# Patient Record
Sex: FEMALE | Race: WHITE | NOT HISPANIC OR LATINO | Employment: OTHER | URBAN - METROPOLITAN AREA
[De-identification: names, ages, dates, MRNs, and addresses within clinical notes are randomized per-mention and may not be internally consistent; named-entity substitution may affect disease eponyms.]

---

## 2022-01-12 ENCOUNTER — OFFICE VISIT (OUTPATIENT)
Dept: CARDIOLOGY | Facility: CLINIC | Age: 72
End: 2022-01-12

## 2022-01-12 VITALS
SYSTOLIC BLOOD PRESSURE: 119 MMHG | HEART RATE: 65 BPM | DIASTOLIC BLOOD PRESSURE: 72 MMHG | BODY MASS INDEX: 29.16 KG/M2 | OXYGEN SATURATION: 97 % | WEIGHT: 175 LBS | HEIGHT: 65 IN

## 2022-01-12 DIAGNOSIS — R00.2 PALPITATIONS: ICD-10-CM

## 2022-01-12 DIAGNOSIS — I10 HYPERTENSION, UNSPECIFIED TYPE: ICD-10-CM

## 2022-01-12 PROCEDURE — 99203 OFFICE O/P NEW LOW 30 MIN: CPT | Performed by: INTERNAL MEDICINE

## 2022-01-12 PROCEDURE — 93010 ELECTROCARDIOGRAM REPORT: CPT | Performed by: INTERNAL MEDICINE

## 2022-01-12 RX ORDER — ZINC GLUCONATE 50 MG
50 TABLET ORAL DAILY
COMMUNITY

## 2022-01-12 RX ORDER — LISINOPRIL AND HYDROCHLOROTHIAZIDE 25; 20 MG/1; MG/1
TABLET ORAL
COMMUNITY
End: 2022-02-15 | Stop reason: SDUPTHER

## 2022-01-12 RX ORDER — LISINOPRIL AND HYDROCHLOROTHIAZIDE 25; 20 MG/1; MG/1
1 TABLET ORAL DAILY
COMMUNITY
Start: 2021-12-28

## 2022-01-12 RX ORDER — MOMETASONE FUROATE 1 MG/G
CREAM TOPICAL
Status: ON HOLD | COMMUNITY
End: 2022-03-21

## 2022-01-12 RX ORDER — ROSUVASTATIN CALCIUM 10 MG/1
10 TABLET, COATED ORAL DAILY
COMMUNITY
Start: 2021-12-28

## 2022-01-12 RX ORDER — MULTIVIT WITH MINERALS/LUTEIN
1000 TABLET ORAL DAILY
COMMUNITY

## 2022-01-12 RX ORDER — OMEPRAZOLE 20 MG/1
CAPSULE, DELAYED RELEASE ORAL
Status: ON HOLD | COMMUNITY
Start: 2019-01-29 | End: 2022-03-21

## 2022-01-12 NOTE — PROGRESS NOTES
HP      Name: Becky Carmona ADMIT: (Not on file)   : 1950  PCP: Medhat Gregory MD    MRN: [id number] LOS: 0 days   AGE/SEX: 71 y.o. female  ROOM: Room/bed info not found     Chief Complaint   Patient presents with   • Palpitations     New Pt Consult   • Hypertension     New Pt Consult       Subjective         History of present illness  Becky Carmona is a 71-year-old female patient who has had palpitations for many years, just today for evaluation.  Patient does not have a prior history of coronary artery disease or other cardiac problems.  Previously she has had evaluation for palpitations but no arrhythmias could be found.  Here lately however her symptoms are becoming more frequent occurring several times a month and lasting up to several hours.  When she has the palpitations she feels short of breath and also notices that her blood pressure drops.  Denies any chest pain no syncopal episodes no lower extremity edema.    No past medical history on file.  No past surgical history on file.  No family history on file.  Social History     Tobacco Use   • Smoking status: Never Smoker   • Smokeless tobacco: Not on file   Substance Use Topics   • Alcohol use: Not on file   • Drug use: Not on file     (Not in a hospital admission)    Allergies:  Ciprofloxacin and Nitrofurantoin macrocrystal    Review of systems    Constitutional: Negative.    Respiratory and cardiovascular: As detailed in HPI section.  Gastrointestinal: Negative for constipation, nausea and vomiting negative for abdominal distention, abdominal pain and diarrhea.   Genitourinary: Negative for difficulty urinating and flank pain.   Musculoskeletal: Negative for arthralgias, joint swelling and myalgias.   Skin: Negative for color change, rash and wound.   Neurological: Negative for dizziness, syncope, weakness and headaches.   Hematological: Negative for adenopathy.   Psychiatric/Behavioral: Negative for confusion.   All other  systems reviewed and are negative.    Physical Exam  VITALS REVIEWED    General:      well developed, in no acute distress.    Head:      normocephalic and atraumatic.    Eyes:      PERRL/EOM intact, conjunctiva and sclera clear with out nystagmus.    Neck:      no masses, thyromegaly,  trachea central with normal respiratory effort and PMI displaced laterally  Lungs:      Clear to auscultation bilaterally  Heart:       Regular rate and rhythm  Msk:      no deformity or scoliosis noted of thoracic or lumbar spine.    Pulses:      pulses normal in all 4 extremities.    Extremities:       No lower extremity edema  Neurologic:      no focal deficits.   alert oriented x3  Skin:      intact without lesions or rashes.    Psych:      alert and cooperative; normal mood and affect; normal attention span and concentration.      Result Review :                Pertinent cardiac workup    1. EKG 1/12/2022 sinus rhythm normal EKG.  2. Echocardiogram 1/12/2022 ejection fraction 60 to 65%.        Procedures        Assessment and Plan      Becky Carmona is a 71-year-old female patient who is here today for evaluation of palpitations which have been going on for several years, are becoming much more frequent occurring several times a month and lasting several hours each.  Her EKG today shows sinus rhythm but there is suspicion for atrial fibrillation based on her symptoms.  Her echo shows normal ejection fraction.  She does not have any symptoms suggestive of angina.  I would like to go ahead and order a 1 month monitor and see her in the clinic afterwards.  If arrhythmias are found then we will address them accordingly.  Patient is agreeable with the plan.    Diagnoses and all orders for this visit:    1. Hypertension, unspecified type    2. Palpitations  -     Mobile Cardiac Outpatient Telemetry; Future           No follow-ups on file.  Patient was given instructions and counseling regarding her condition or for health  maintenance advice. Please see specific information pulled into the AVS if appropriate.

## 2022-02-15 ENCOUNTER — TELEPHONE (OUTPATIENT)
Dept: CARDIOLOGY | Facility: CLINIC | Age: 72
End: 2022-02-15

## 2022-02-15 ENCOUNTER — PREP FOR SURGERY (OUTPATIENT)
Dept: OTHER | Facility: HOSPITAL | Age: 72
End: 2022-02-15

## 2022-02-15 ENCOUNTER — OFFICE VISIT (OUTPATIENT)
Dept: CARDIOLOGY | Facility: CLINIC | Age: 72
End: 2022-02-15

## 2022-02-15 VITALS
DIASTOLIC BLOOD PRESSURE: 76 MMHG | HEIGHT: 65 IN | WEIGHT: 180.25 LBS | OXYGEN SATURATION: 96 % | SYSTOLIC BLOOD PRESSURE: 120 MMHG | BODY MASS INDEX: 30.03 KG/M2 | HEART RATE: 174 BPM

## 2022-02-15 DIAGNOSIS — I48.0 PAROXYSMAL ATRIAL FIBRILLATION: Primary | ICD-10-CM

## 2022-02-15 DIAGNOSIS — I10 PRIMARY HYPERTENSION: ICD-10-CM

## 2022-02-15 PROCEDURE — 93000 ELECTROCARDIOGRAM COMPLETE: CPT | Performed by: INTERNAL MEDICINE

## 2022-02-15 PROCEDURE — 99214 OFFICE O/P EST MOD 30 MIN: CPT | Performed by: INTERNAL MEDICINE

## 2022-02-15 RX ORDER — SODIUM CHLORIDE 0.9 % (FLUSH) 0.9 %
3 SYRINGE (ML) INJECTION EVERY 12 HOURS SCHEDULED
Status: CANCELLED | OUTPATIENT
Start: 2022-02-15

## 2022-02-15 RX ORDER — FLECAINIDE ACETATE 50 MG/1
100 TABLET ORAL 2 TIMES DAILY
Qty: 60 TABLET | Refills: 3 | Status: ON HOLD | OUTPATIENT
Start: 2022-02-15 | End: 2022-03-21

## 2022-02-15 RX ORDER — SODIUM CHLORIDE 0.9 % (FLUSH) 0.9 %
3-10 SYRINGE (ML) INJECTION AS NEEDED
Status: CANCELLED | OUTPATIENT
Start: 2022-02-15

## 2022-02-15 NOTE — PROGRESS NOTES
HP      Name: Becky Carmona ADMIT: (Not on file)   : 1950  PCP: Medhat Gregory MD    MRN: [id number] LOS: 0 days   AGE/SEX: 71 y.o. female  ROOM: Room/bed info not found     Chief Complaint   Patient presents with   • Hypertension     atrial fibrillation on holter       Subjective         History of present illness  Becky Carmona is a 71-year-old female patient who has palpitations for many years, I had seen her in 2021 for the same.  Previously no diagnosis was made.  On that day she was in sinus rhythm however a Holter monitor was placed and patient developed palpitations yesterday and we received an alert showing atrial fibrillation with ventricular rates in the 170s.  Patient was called and she was seen in the clinic today.  She still complains of palpitations but mainly shortness of breath and weakness.  Denies any chest pain no syncopal episodes.  She says that the symptoms have been going on for several years with same presentation.    Past Medical History:   Diagnosis Date   • Hypertension      Past Surgical History:   Procedure Laterality Date   • CHOLECYSTECTOMY       Family History   Problem Relation Age of Onset   • Heart disease Mother    • Hypertension Mother    • Hypertension Father    • Heart disease Sister    • Hypertension Sister      Social History     Tobacco Use   • Smoking status: Never Smoker   • Smokeless tobacco: Never Used   Vaping Use   • Vaping Use: Never used   Substance Use Topics   • Alcohol use: Not Currently   • Drug use: Never     (Not in a hospital admission)    Allergies:  Ciprofloxacin and Nitrofurantoin macrocrystal    Review of systems    Constitutional: Negative.    Respiratory and cardiovascular: As detailed in HPI section.  Gastrointestinal: Negative for constipation, nausea and vomiting negative for abdominal distention, abdominal pain and diarrhea.   Genitourinary: Negative for difficulty urinating and flank pain.   Musculoskeletal:  Negative for arthralgias, joint swelling and myalgias.   Skin: Negative for color change, rash and wound.   Neurological: Negative for dizziness, syncope, weakness and headaches.   Hematological: Negative for adenopathy.   Psychiatric/Behavioral: Negative for confusion.   All other systems reviewed and are negative.    Physical Exam  VITALS REVIEWED    General:      well developed, in no acute distress.    Head:      normocephalic and atraumatic.    Eyes:      PERRL/EOM intact, conjunctiva and sclera clear with out nystagmus.    Neck:      no masses, thyromegaly,  trachea central with normal respiratory effort and PMI displaced laterally  Lungs:      Clear to auscultation bilaterally  Heart:       Tachycardic, irregular rhythm  Msk:      no deformity or scoliosis noted of thoracic or lumbar spine.    Pulses:      pulses normal in all 4 extremities.    Extremities:       No lower extremity edema  Neurologic:      no focal deficits.   alert oriented x3  Skin:      intact without lesions or rashes.    Psych:      alert and cooperative; normal mood and affect; normal attention span and concentration.      Result Review :                Pertinent cardiac workup    1. Echocardiogram 1/12/2022 ejection fraction 60 to 65%, mild aortic insufficiency  2. EKG 1/12/2022 sinus rhythm  3. EKG 2/15/2022 atrial fibrillation ventricular rate of 173 bpm        ECG 12 Lead    Date/Time: 2/15/2022 1:25 PM  Performed by: Hermila Wasserman MD  Authorized by: Hermila Wasserman MD   Comparison: compared with previous ECG   Comparison to previous ECG: Previously sinus  Rhythm: atrial fibrillation  Rate: tachycardic  BPM: 173  Conduction: conduction normal  ST Segments: ST segments normal  QRS axis: normal  Other findings: non-specific ST-T wave changes                Assessment and Plan      Becky Carmona is a 71-year-old female patient who has history of palpitations for several years also fatigue and extreme shortness of breath  during the episodes.  Previously no diagnosis was made however a Holter monitor was placed and she was found to be in atrial fibrillation with rapid ventricular rates on 2/14/2022.  Today she is also in A. fib with a rate of 173 on EKG.  I had a long discussion with the patient about A. fib management.  Since her atrial fibrillation is paroxysmal and she is very symptomatic when it, all efforts should be made towards rhythm control strategy.  I offered antiarrhythmic therapy versus ablation patient is more interested in having an ablation done.  I will go ahead and start her on Eliquis 5 mg twice daily also start her on flecainide 100 twice daily in addition to metoprolol 25 twice a day with plans for A. fib ablation in March.  Hopefully the patient will convert with antiarrhythmic therapy in the meantime, otherwise she will need cardioversion soon.  Patient is agreeable with the outlined plan.    Diagnoses and all orders for this visit:    1. Paroxysmal atrial fibrillation (HCC) (Primary)  Overview:  Added automatically from request for surgery [id number]      2. Primary hypertension    Other orders  -     apixaban (ELIQUIS) 5 MG tablet tablet; Take 1 tablet by mouth 2 (Two) Times a Day for 30 days.  Dispense: 60 tablet; Refill: 3  -     metoprolol tartrate (LOPRESSOR) 25 MG tablet; Take 1 tablet by mouth 2 (Two) Times a Day for 30 days.  Dispense: 60 tablet; Refill: 11  -     flecainide (TAMBOCOR) 50 MG tablet; Take 2 tablets by mouth 2 (Two) Times a Day for 30 days.  Dispense: 60 tablet; Refill: 3           No follow-ups on file.  Patient was given instructions and counseling regarding her condition or for health maintenance advice. Please see specific information pulled into the AVS if appropriate.

## 2022-02-15 NOTE — TELEPHONE ENCOUNTER
Tonya from [facility] called and stated that pt had new onset afib  bpm @ 11:01 2/14/22. Please advise.

## 2022-03-18 ENCOUNTER — LAB (OUTPATIENT)
Dept: LAB | Facility: HOSPITAL | Age: 72
End: 2022-03-18

## 2022-03-18 DIAGNOSIS — I48.0 PAROXYSMAL ATRIAL FIBRILLATION: ICD-10-CM

## 2022-03-18 LAB
ALBUMIN SERPL-MCNC: 4.6 G/DL (ref 3.5–5.2)
ALBUMIN/GLOB SERPL: 1.9 G/DL
ALP SERPL-CCNC: 103 U/L (ref 39–117)
ALT SERPL W P-5'-P-CCNC: 10 U/L (ref 1–33)
ANION GAP SERPL CALCULATED.3IONS-SCNC: 8.3 MMOL/L (ref 5–15)
AST SERPL-CCNC: 15 U/L (ref 1–32)
B-HCG UR QL: NEGATIVE
BASOPHILS # BLD AUTO: 0.03 10*3/MM3 (ref 0–0.2)
BASOPHILS NFR BLD AUTO: 0.4 % (ref 0–1.5)
BILIRUB SERPL-MCNC: 0.4 MG/DL (ref 0–1.2)
BUN SERPL-MCNC: 29 MG/DL (ref 8–23)
BUN/CREAT SERPL: 25.4 (ref 7–25)
CALCIUM SPEC-SCNC: 9.7 MG/DL (ref 8.6–10.5)
CHLORIDE SERPL-SCNC: 102 MMOL/L (ref 98–107)
CO2 SERPL-SCNC: 28.7 MMOL/L (ref 22–29)
CREAT SERPL-MCNC: 1.14 MG/DL (ref 0.57–1)
DEPRECATED RDW RBC AUTO: 45.3 FL (ref 37–54)
EGFRCR SERPLBLD CKD-EPI 2021: 51.6 ML/MIN/1.73
EOSINOPHIL # BLD AUTO: 0.16 10*3/MM3 (ref 0–0.4)
EOSINOPHIL NFR BLD AUTO: 2.4 % (ref 0.3–6.2)
ERYTHROCYTE [DISTWIDTH] IN BLOOD BY AUTOMATED COUNT: 14.2 % (ref 12.3–15.4)
GLOBULIN UR ELPH-MCNC: 2.4 GM/DL
GLUCOSE SERPL-MCNC: 92 MG/DL (ref 65–99)
HCT VFR BLD AUTO: 41 % (ref 34–46.6)
HGB BLD-MCNC: 13.7 G/DL (ref 12–15.9)
IMM GRANULOCYTES # BLD AUTO: 0.02 10*3/MM3 (ref 0–0.05)
IMM GRANULOCYTES NFR BLD AUTO: 0.3 % (ref 0–0.5)
INR PPP: 0.99 (ref 0.93–1.1)
LYMPHOCYTES # BLD AUTO: 2.17 10*3/MM3 (ref 0.7–3.1)
LYMPHOCYTES NFR BLD AUTO: 32.4 % (ref 19.6–45.3)
MAGNESIUM SERPL-MCNC: 2 MG/DL (ref 1.6–2.4)
MCH RBC QN AUTO: 29.6 PG (ref 26.6–33)
MCHC RBC AUTO-ENTMCNC: 33.4 G/DL (ref 31.5–35.7)
MCV RBC AUTO: 88.6 FL (ref 79–97)
MONOCYTES # BLD AUTO: 0.69 10*3/MM3 (ref 0.1–0.9)
MONOCYTES NFR BLD AUTO: 10.3 % (ref 5–12)
NEUTROPHILS NFR BLD AUTO: 3.62 10*3/MM3 (ref 1.7–7)
NEUTROPHILS NFR BLD AUTO: 54.2 % (ref 42.7–76)
NRBC BLD AUTO-RTO: 0 /100 WBC (ref 0–0.2)
PLATELET # BLD AUTO: 193 10*3/MM3 (ref 140–450)
PMV BLD AUTO: 12.2 FL (ref 6–12)
POTASSIUM SERPL-SCNC: 4.2 MMOL/L (ref 3.5–5.2)
PROT SERPL-MCNC: 7 G/DL (ref 6–8.5)
PROTHROMBIN TIME: 11 SECONDS (ref 9.6–11.7)
RBC # BLD AUTO: 4.63 10*6/MM3 (ref 3.77–5.28)
SARS-COV-2 ORF1AB RESP QL NAA+PROBE: NOT DETECTED
SODIUM SERPL-SCNC: 139 MMOL/L (ref 136–145)
WBC NRBC COR # BLD: 6.69 10*3/MM3 (ref 3.4–10.8)

## 2022-03-18 PROCEDURE — 80053 COMPREHEN METABOLIC PANEL: CPT

## 2022-03-18 PROCEDURE — U0004 COV-19 TEST NON-CDC HGH THRU: HCPCS

## 2022-03-18 PROCEDURE — 83735 ASSAY OF MAGNESIUM: CPT

## 2022-03-18 PROCEDURE — 36415 COLL VENOUS BLD VENIPUNCTURE: CPT

## 2022-03-18 PROCEDURE — 85025 COMPLETE CBC W/AUTO DIFF WBC: CPT

## 2022-03-18 PROCEDURE — U0005 INFEC AGEN DETEC AMPLI PROBE: HCPCS

## 2022-03-18 PROCEDURE — 85610 PROTHROMBIN TIME: CPT

## 2022-03-18 PROCEDURE — 81025 URINE PREGNANCY TEST: CPT

## 2022-03-20 ENCOUNTER — ANESTHESIA EVENT (OUTPATIENT)
Dept: CARDIOLOGY | Facility: HOSPITAL | Age: 72
End: 2022-03-20

## 2022-03-21 ENCOUNTER — HOSPITAL ENCOUNTER (OUTPATIENT)
Facility: HOSPITAL | Age: 72
Discharge: HOME | End: 2022-03-21
Attending: INTERNAL MEDICINE | Admitting: INTERNAL MEDICINE

## 2022-03-21 ENCOUNTER — ANESTHESIA (OUTPATIENT)
Dept: CARDIOLOGY | Facility: HOSPITAL | Age: 72
End: 2022-03-21

## 2022-03-21 VITALS
HEART RATE: 52 BPM | TEMPERATURE: 96.8 F | OXYGEN SATURATION: 94 % | HEIGHT: 64 IN | RESPIRATION RATE: 8 BRPM | BODY MASS INDEX: 30.67 KG/M2 | WEIGHT: 179.68 LBS | DIASTOLIC BLOOD PRESSURE: 48 MMHG | SYSTOLIC BLOOD PRESSURE: 121 MMHG

## 2022-03-21 DIAGNOSIS — I48.0 PAROXYSMAL ATRIAL FIBRILLATION: ICD-10-CM

## 2022-03-21 LAB
ACT BLD: 255 SECONDS (ref 89–137)
ACT BLD: 303 SECONDS (ref 89–137)
ACT BLD: 321 SECONDS (ref 89–137)
ACT BLD: 327 SECONDS (ref 89–137)
ACT BLD: 327 SECONDS (ref 89–137)
ACT BLD: 345 SECONDS (ref 89–137)
ACT BLD: 350 SECONDS (ref 89–137)

## 2022-03-21 PROCEDURE — A9270 NON-COVERED ITEM OR SERVICE: HCPCS | Performed by: INTERNAL MEDICINE

## 2022-03-21 PROCEDURE — 93656 COMPRE EP EVAL ABLTJ ATR FIB: CPT | Performed by: INTERNAL MEDICINE

## 2022-03-21 PROCEDURE — 25010000002 PHENYLEPHRINE 10 MG/ML SOLUTION 5 ML VIAL

## 2022-03-21 PROCEDURE — 63710000001 ACETAMINOPHEN 325 MG TABLET: Performed by: INTERNAL MEDICINE

## 2022-03-21 PROCEDURE — 25010000002 HEPARIN (PORCINE) 25000-0.45 UT/250ML-% SOLUTION

## 2022-03-21 PROCEDURE — 25010000002 PROTAMINE SULFATE PER 10 MG

## 2022-03-21 PROCEDURE — C1769 GUIDE WIRE: HCPCS | Performed by: INTERNAL MEDICINE

## 2022-03-21 PROCEDURE — C1732 CATH, EP, DIAG/ABL, 3D/VECT: HCPCS | Performed by: INTERNAL MEDICINE

## 2022-03-21 PROCEDURE — C1894 INTRO/SHEATH, NON-LASER: HCPCS | Performed by: INTERNAL MEDICINE

## 2022-03-21 PROCEDURE — C1766 INTRO/SHEATH,STRBLE,NON-PEEL: HCPCS | Performed by: INTERNAL MEDICINE

## 2022-03-21 PROCEDURE — 25010000002 DEXAMETHASONE SODIUM PHOSPHATE 20 MG/5ML SOLUTION

## 2022-03-21 PROCEDURE — G0378 HOSPITAL OBSERVATION PER HR: HCPCS

## 2022-03-21 PROCEDURE — 0 IOPAMIDOL PER 1 ML: Performed by: INTERNAL MEDICINE

## 2022-03-21 PROCEDURE — S0260 H&P FOR SURGERY: HCPCS | Performed by: INTERNAL MEDICINE

## 2022-03-21 PROCEDURE — 25010000002 HEPARIN (PORCINE) PER 1000 UNITS

## 2022-03-21 PROCEDURE — 25010000002 FENTANYL CITRATE (PF) 100 MCG/2ML SOLUTION

## 2022-03-21 PROCEDURE — C1733 CATH, EP, OTHR THAN COOL-TIP: HCPCS | Performed by: INTERNAL MEDICINE

## 2022-03-21 PROCEDURE — C1759 CATH, INTRA ECHOCARDIOGRAPHY: HCPCS | Performed by: INTERNAL MEDICINE

## 2022-03-21 PROCEDURE — 85347 COAGULATION TIME ACTIVATED: CPT

## 2022-03-21 PROCEDURE — C1730 CATH, EP, 19 OR FEW ELECT: HCPCS | Performed by: INTERNAL MEDICINE

## 2022-03-21 PROCEDURE — 25010000002 ONDANSETRON PER 1 MG

## 2022-03-21 PROCEDURE — 25010000002 PROPOFOL 10 MG/ML EMULSION

## 2022-03-21 RX ORDER — NALOXONE HCL 0.4 MG/ML
0.4 VIAL (ML) INJECTION AS NEEDED
Status: DISCONTINUED | OUTPATIENT
Start: 2022-03-21 | End: 2022-03-21 | Stop reason: HOSPADM

## 2022-03-21 RX ORDER — HEPARIN SODIUM 10000 [USP'U]/100ML
INJECTION, SOLUTION INTRAVENOUS CONTINUOUS PRN
Status: DISCONTINUED | OUTPATIENT
Start: 2022-03-21 | End: 2022-03-21 | Stop reason: SURG

## 2022-03-21 RX ORDER — SODIUM CHLORIDE 9 MG/ML
INJECTION, SOLUTION INTRAVENOUS CONTINUOUS PRN
Status: DISCONTINUED | OUTPATIENT
Start: 2022-03-21 | End: 2022-03-21 | Stop reason: SURG

## 2022-03-21 RX ORDER — OMEPRAZOLE 20 MG/1
20 CAPSULE, DELAYED RELEASE ORAL DAILY
COMMUNITY
End: 2023-01-18 | Stop reason: ALTCHOICE

## 2022-03-21 RX ORDER — ONDANSETRON 2 MG/ML
4 INJECTION INTRAMUSCULAR; INTRAVENOUS ONCE AS NEEDED
Status: DISCONTINUED | OUTPATIENT
Start: 2022-03-21 | End: 2022-03-21 | Stop reason: HOSPADM

## 2022-03-21 RX ORDER — ROSUVASTATIN CALCIUM 10 MG/1
10 TABLET, COATED ORAL DAILY
Status: DISCONTINUED | OUTPATIENT
Start: 2022-03-22 | End: 2022-03-21 | Stop reason: HOSPADM

## 2022-03-21 RX ORDER — DIPHENHYDRAMINE HYDROCHLORIDE 50 MG/ML
12.5 INJECTION INTRAMUSCULAR; INTRAVENOUS
Status: DISCONTINUED | OUTPATIENT
Start: 2022-03-21 | End: 2022-03-21 | Stop reason: HOSPADM

## 2022-03-21 RX ORDER — DEXAMETHASONE SODIUM PHOSPHATE 4 MG/ML
INJECTION, SOLUTION INTRA-ARTICULAR; INTRALESIONAL; INTRAMUSCULAR; INTRAVENOUS; SOFT TISSUE AS NEEDED
Status: DISCONTINUED | OUTPATIENT
Start: 2022-03-21 | End: 2022-03-21 | Stop reason: SURG

## 2022-03-21 RX ORDER — OXYCODONE HYDROCHLORIDE 5 MG/1
5 TABLET ORAL ONCE AS NEEDED
Status: DISCONTINUED | OUTPATIENT
Start: 2022-03-21 | End: 2022-03-21 | Stop reason: HOSPADM

## 2022-03-21 RX ORDER — ACETAMINOPHEN 325 MG/1
650 TABLET ORAL EVERY 6 HOURS PRN
Status: DISCONTINUED | OUTPATIENT
Start: 2022-03-21 | End: 2022-03-21 | Stop reason: HOSPADM

## 2022-03-21 RX ORDER — ONDANSETRON 2 MG/ML
INJECTION INTRAMUSCULAR; INTRAVENOUS AS NEEDED
Status: DISCONTINUED | OUTPATIENT
Start: 2022-03-21 | End: 2022-03-21 | Stop reason: SURG

## 2022-03-21 RX ORDER — FENTANYL CITRATE 50 UG/ML
INJECTION, SOLUTION INTRAMUSCULAR; INTRAVENOUS AS NEEDED
Status: DISCONTINUED | OUTPATIENT
Start: 2022-03-21 | End: 2022-03-21 | Stop reason: SURG

## 2022-03-21 RX ORDER — SODIUM CHLORIDE 9 MG/ML
30 INJECTION, SOLUTION INTRAVENOUS CONTINUOUS
Status: DISCONTINUED | OUTPATIENT
Start: 2022-03-21 | End: 2022-03-21

## 2022-03-21 RX ORDER — PROPOFOL 10 MG/ML
VIAL (ML) INTRAVENOUS AS NEEDED
Status: DISCONTINUED | OUTPATIENT
Start: 2022-03-21 | End: 2022-03-21 | Stop reason: SURG

## 2022-03-21 RX ORDER — MEPERIDINE HYDROCHLORIDE 25 MG/ML
12.5 INJECTION INTRAMUSCULAR; INTRAVENOUS; SUBCUTANEOUS
Status: DISCONTINUED | OUTPATIENT
Start: 2022-03-21 | End: 2022-03-21 | Stop reason: HOSPADM

## 2022-03-21 RX ORDER — PROMETHAZINE HYDROCHLORIDE 25 MG/1
25 TABLET ORAL ONCE AS NEEDED
Status: DISCONTINUED | OUTPATIENT
Start: 2022-03-21 | End: 2022-03-21 | Stop reason: HOSPADM

## 2022-03-21 RX ORDER — ROCURONIUM BROMIDE 10 MG/ML
INJECTION, SOLUTION INTRAVENOUS AS NEEDED
Status: DISCONTINUED | OUTPATIENT
Start: 2022-03-21 | End: 2022-03-21 | Stop reason: SURG

## 2022-03-21 RX ORDER — OXYCODONE HYDROCHLORIDE 5 MG/1
10 TABLET ORAL EVERY 4 HOURS PRN
Status: DISCONTINUED | OUTPATIENT
Start: 2022-03-21 | End: 2022-03-21 | Stop reason: HOSPADM

## 2022-03-21 RX ORDER — FLECAINIDE ACETATE 100 MG/1
50 TABLET ORAL 2 TIMES DAILY
Start: 2022-03-21 | End: 2022-07-20

## 2022-03-21 RX ORDER — HYDROMORPHONE HCL 110MG/55ML
0.5 PATIENT CONTROLLED ANALGESIA SYRINGE INTRAVENOUS
Status: DISCONTINUED | OUTPATIENT
Start: 2022-03-21 | End: 2022-03-21 | Stop reason: HOSPADM

## 2022-03-21 RX ORDER — EPHEDRINE SULFATE 5 MG/ML
5 INJECTION INTRAVENOUS ONCE AS NEEDED
Status: DISCONTINUED | OUTPATIENT
Start: 2022-03-21 | End: 2022-03-21 | Stop reason: HOSPADM

## 2022-03-21 RX ORDER — MIDAZOLAM HYDROCHLORIDE 1 MG/ML
1 INJECTION INTRAMUSCULAR; INTRAVENOUS
Status: DISCONTINUED | OUTPATIENT
Start: 2022-03-21 | End: 2022-03-21 | Stop reason: HOSPADM

## 2022-03-21 RX ORDER — PANTOPRAZOLE SODIUM 40 MG/1
40 TABLET, DELAYED RELEASE ORAL EVERY MORNING
Status: DISCONTINUED | OUTPATIENT
Start: 2022-03-22 | End: 2022-03-21 | Stop reason: HOSPADM

## 2022-03-21 RX ORDER — IPRATROPIUM BROMIDE AND ALBUTEROL SULFATE 2.5; .5 MG/3ML; MG/3ML
3 SOLUTION RESPIRATORY (INHALATION) ONCE AS NEEDED
Status: DISCONTINUED | OUTPATIENT
Start: 2022-03-21 | End: 2022-03-21 | Stop reason: HOSPADM

## 2022-03-21 RX ORDER — GLYCOPYRROLATE 0.2 MG/ML
INJECTION INTRAMUSCULAR; INTRAVENOUS AS NEEDED
Status: DISCONTINUED | OUTPATIENT
Start: 2022-03-21 | End: 2022-03-21 | Stop reason: SURG

## 2022-03-21 RX ORDER — LABETALOL HYDROCHLORIDE 5 MG/ML
5 INJECTION, SOLUTION INTRAVENOUS
Status: DISCONTINUED | OUTPATIENT
Start: 2022-03-21 | End: 2022-03-21 | Stop reason: HOSPADM

## 2022-03-21 RX ORDER — FLECAINIDE ACETATE 50 MG/1
50 TABLET ORAL 2 TIMES DAILY
Status: DISCONTINUED | OUTPATIENT
Start: 2022-03-21 | End: 2022-03-21 | Stop reason: HOSPADM

## 2022-03-21 RX ORDER — SODIUM CHLORIDE, SODIUM LACTATE, POTASSIUM CHLORIDE, CALCIUM CHLORIDE 600; 310; 30; 20 MG/100ML; MG/100ML; MG/100ML; MG/100ML
100 INJECTION, SOLUTION INTRAVENOUS CONTINUOUS
Status: DISCONTINUED | OUTPATIENT
Start: 2022-03-21 | End: 2022-03-21

## 2022-03-21 RX ORDER — LIDOCAINE HYDROCHLORIDE 10 MG/ML
INJECTION, SOLUTION EPIDURAL; INFILTRATION; INTRACAUDAL; PERINEURAL AS NEEDED
Status: DISCONTINUED | OUTPATIENT
Start: 2022-03-21 | End: 2022-03-21 | Stop reason: SURG

## 2022-03-21 RX ORDER — EPHEDRINE SULFATE 5 MG/ML
INJECTION INTRAVENOUS AS NEEDED
Status: DISCONTINUED | OUTPATIENT
Start: 2022-03-21 | End: 2022-03-21 | Stop reason: SURG

## 2022-03-21 RX ORDER — MOMETASONE FUROATE 1 MG/G
1 CREAM TOPICAL DAILY PRN
COMMUNITY

## 2022-03-21 RX ORDER — PROTAMINE SULFATE 10 MG/ML
INJECTION, SOLUTION INTRAVENOUS AS NEEDED
Status: DISCONTINUED | OUTPATIENT
Start: 2022-03-21 | End: 2022-03-21 | Stop reason: SURG

## 2022-03-21 RX ORDER — HEPARIN SODIUM 1000 [USP'U]/ML
INJECTION, SOLUTION INTRAVENOUS; SUBCUTANEOUS AS NEEDED
Status: DISCONTINUED | OUTPATIENT
Start: 2022-03-21 | End: 2022-03-21 | Stop reason: SURG

## 2022-03-21 RX ORDER — FLECAINIDE ACETATE 100 MG/1
100 TABLET ORAL 2 TIMES DAILY
Status: ON HOLD | COMMUNITY
End: 2022-03-21 | Stop reason: SDUPTHER

## 2022-03-21 RX ORDER — ASCORBIC ACID 500 MG
1000 TABLET ORAL DAILY
Status: DISCONTINUED | OUTPATIENT
Start: 2022-03-21 | End: 2022-03-21 | Stop reason: HOSPADM

## 2022-03-21 RX ORDER — PHENYLEPHRINE HCL IN 0.9% NACL 1 MG/10 ML
SYRINGE (ML) INTRAVENOUS AS NEEDED
Status: DISCONTINUED | OUTPATIENT
Start: 2022-03-21 | End: 2022-03-21 | Stop reason: SURG

## 2022-03-21 RX ORDER — FENTANYL CITRATE 50 UG/ML
25 INJECTION, SOLUTION INTRAMUSCULAR; INTRAVENOUS
Status: DISCONTINUED | OUTPATIENT
Start: 2022-03-21 | End: 2022-03-21 | Stop reason: HOSPADM

## 2022-03-21 RX ADMIN — EPHEDRINE SULFATE 10 MG: 5 INJECTION INTRAVENOUS at 08:50

## 2022-03-21 RX ADMIN — EPHEDRINE SULFATE 10 MG: 5 INJECTION INTRAVENOUS at 08:27

## 2022-03-21 RX ADMIN — EPHEDRINE SULFATE 10 MG: 5 INJECTION INTRAVENOUS at 08:28

## 2022-03-21 RX ADMIN — HEPARIN SODIUM 2000 UNITS: 1000 INJECTION, SOLUTION INTRAVENOUS; SUBCUTANEOUS at 09:18

## 2022-03-21 RX ADMIN — HEPARIN SODIUM 18 UNITS/KG/HR: 10000 INJECTION, SOLUTION INTRAVENOUS at 08:46

## 2022-03-21 RX ADMIN — PHENYLEPHRINE HYDROCHLORIDE 0.2 MCG/KG/MIN: 10 INJECTION INTRAVENOUS at 09:11

## 2022-03-21 RX ADMIN — EPHEDRINE SULFATE 5 MG: 5 INJECTION INTRAVENOUS at 08:34

## 2022-03-21 RX ADMIN — HEPARIN SODIUM 9000 UNITS: 1000 INJECTION, SOLUTION INTRAVENOUS; SUBCUTANEOUS at 08:44

## 2022-03-21 RX ADMIN — LIDOCAINE HYDROCHLORIDE 50 MG: 10 INJECTION, SOLUTION EPIDURAL; INFILTRATION; INTRACAUDAL; PERINEURAL at 08:20

## 2022-03-21 RX ADMIN — Medication 50 MCG: at 09:08

## 2022-03-21 RX ADMIN — Medication 100 MCG: at 08:54

## 2022-03-21 RX ADMIN — DEXAMETHASONE SODIUM PHOSPHATE 4 MG: 4 INJECTION, SOLUTION INTRAMUSCULAR; INTRAVENOUS at 09:18

## 2022-03-21 RX ADMIN — SODIUM CHLORIDE 30 ML/HR: 9 INJECTION, SOLUTION INTRAVENOUS at 06:50

## 2022-03-21 RX ADMIN — EPHEDRINE SULFATE 10 MG: 5 INJECTION INTRAVENOUS at 08:25

## 2022-03-21 RX ADMIN — EPHEDRINE SULFATE 10 MG: 5 INJECTION INTRAVENOUS at 09:07

## 2022-03-21 RX ADMIN — FENTANYL CITRATE 50 MCG: 50 INJECTION, SOLUTION INTRAMUSCULAR; INTRAVENOUS at 08:11

## 2022-03-21 RX ADMIN — FENTANYL CITRATE 25 MCG: 50 INJECTION, SOLUTION INTRAMUSCULAR; INTRAVENOUS at 09:39

## 2022-03-21 RX ADMIN — SUGAMMADEX 200 MG: 100 INJECTION, SOLUTION INTRAVENOUS at 11:11

## 2022-03-21 RX ADMIN — ROCURONIUM BROMIDE 40 MG: 10 INJECTION INTRAVENOUS at 08:20

## 2022-03-21 RX ADMIN — EPHEDRINE SULFATE 10 MG: 5 INJECTION INTRAVENOUS at 08:46

## 2022-03-21 RX ADMIN — GLYCOPYRROLATE 0.2 MCG: 0.2 INJECTION INTRAMUSCULAR; INTRAVENOUS at 08:54

## 2022-03-21 RX ADMIN — Medication 50 MCG: at 09:05

## 2022-03-21 RX ADMIN — ONDANSETRON 4 MG: 2 INJECTION INTRAMUSCULAR; INTRAVENOUS at 11:04

## 2022-03-21 RX ADMIN — PROPOFOL 150 MG: 10 INJECTION, EMULSION INTRAVENOUS at 08:20

## 2022-03-21 RX ADMIN — ACETAMINOPHEN 650 MG: 325 TABLET ORAL at 13:04

## 2022-03-21 RX ADMIN — HEPARIN SODIUM 2000 UNITS: 1000 INJECTION, SOLUTION INTRAVENOUS; SUBCUTANEOUS at 09:58

## 2022-03-21 RX ADMIN — HEPARIN SODIUM 4000 UNITS: 1000 INJECTION, SOLUTION INTRAVENOUS; SUBCUTANEOUS at 09:04

## 2022-03-21 RX ADMIN — PROPOFOL 20 MG: 10 INJECTION, EMULSION INTRAVENOUS at 08:58

## 2022-03-21 RX ADMIN — ROCURONIUM BROMIDE 10 MG: 10 INJECTION INTRAVENOUS at 08:58

## 2022-03-21 RX ADMIN — SODIUM CHLORIDE: 0.9 INJECTION, SOLUTION INTRAVENOUS at 08:06

## 2022-03-21 RX ADMIN — PROTAMINE SULFATE 50 MG: 10 INJECTION, SOLUTION INTRAVENOUS at 11:02

## 2022-03-21 RX ADMIN — HEPARIN SODIUM 1000 UNITS: 1000 INJECTION, SOLUTION INTRAVENOUS; SUBCUTANEOUS at 09:37

## 2022-03-21 NOTE — ANESTHESIA POSTPROCEDURE EVALUATION
Patient: Becky Carmona    Procedure Summary     Date: 03/21/22 Room / Location: [facility] /  [facility]    Anesthesia Start: 0806 Anesthesia Stop: 1124    Procedure: Ablation atrial fibrillation Ut[place] and Cryo aware (N/A ) Diagnosis:       Paroxysmal atrial fibrillation (HCC)      (aFib)    Providers: Hermila Wasserman MD Provider: Pedro Scott MD    Anesthesia Type: general ASA Status: 3          Anesthesia Type: general    Vitals  Vitals Value Taken Time   /54 03/21/22 1209   Temp 96.8 °F (36 °C) 03/21/22 1209   Pulse 53 03/21/22 1209   Resp 8 03/21/22 1209   SpO2 95 % 03/21/22 1209           Post Anesthesia Care and Evaluation    Patient location during evaluation: PACU  Patient participation: complete - patient participated  Level of consciousness: awake  Pain scale: See nurse's notes for pain score.  Pain management: adequate  Airway patency: patent  Anesthetic complications: No anesthetic complications  PONV Status: none  Cardiovascular status: acceptable  Respiratory status: acceptable  Hydration status: acceptable    Comments: Patient seen and examined postoperatively; vital signs stable; SpO2 greater than or equal to 90%; cardiopulmonary status stable; nausea/vomiting adequately controlled; pain adequately controlled; no apparent anesthesia complications; patient discharged from anesthesia care when discharge criteria were met

## 2022-03-21 NOTE — DISCHARGE SUMMARY
[facility]    Date of Admission: 3/21/2022    Date of Discharge:  3/21/2022    Length of stay:  LOS: 0 days     Admission Diagnosis: Recurrent paroxysmal atrial fibrillation, symptomatic with uncontrolled ventricular response    Discharge Diagnosis: Same, status post A. fib ablation    Presenting Problem/History of Present Illness  Active Hospital Problems    Diagnosis  POA   • **Paroxysmal atrial fibrillation (HCC) [I48.0]  Unknown     Priority: High      Resolved Hospital Problems   No resolved problems to display.          Hospital Course  Becky Carmona is a 71 y.o. female presented for elective A. fib ablation for the above condition.  The procedure was performed on 3/21/2022, no procedural complications.  The patient was monitored for several hours post procedure, she remained in sinus rhythm.  Groin exam revealed no bleeding or hematoma and patient was discharged home in a stable condition with the following medication changes.  Decrease flecainide from 100 to 50 twice daily, decrease metoprolol from 25 to 12.5 twice daily due to bradycardia.  The discharge process took about 35 minutes during which we discussed about long-term A. fib management, medication changes, anticoagulation.  The patient voiced understanding and all her  questions were answered to her  satisfaction.    Past Medical History:   Diagnosis Date   • A-fib (HCC)    • High triglycerides    • HLD (hyperlipidemia)    • Hypertension      Past Surgical History:   Procedure Laterality Date   • CHOLECYSTECTOMY     • UTERINE FIBROID SURGERY  2020     Social History     Socioeconomic History   • Marital status:    Tobacco Use   • Smoking status: Never Smoker   • Smokeless tobacco: Never Used   Vaping Use   • Vaping Use: Never used   Substance and Sexual Activity   • Alcohol use: Not Currently   • Drug use: Never   • Sexual activity: Defer       Procedures Performed: A. fib ablation with pulmonary vein isolation using cryotherapy and  "radiofrequency ablation       Pertinent Test Results:     Lab Results (last 72 hours)     ** No results found for the last 72 hours. **          Condition on Discharge: Stable    Vital Signs  /56   Pulse (!) 49   Temp 96.8 °F (36 °C) (Temporal)   Resp 8   Ht 162.6 cm (64\")   Wt 81.5 kg (179 lb 10.8 oz)   SpO2 99%   BMI 30.84 kg/m²     Physical Exam    Physical Exam  VITALS REVIEWED    General:      well developed, in no acute distress.    Head:      normocephalic and atraumatic.    Eyes:      PERRL/EOM intact, conjunctiva and sclera clear with out nystagmus.    Neck:      no masses, thyromegaly,  trachea central with normal respiratory effort and PMI displaced laterally  Lungs:      Clear  Heart:       Regular rate and rhythm  Msk:      no deformity or scoliosis noted of thoracic or lumbar spine.    Pulses:      pulses normal in all 4 extremities.  No bleeding or hematoma at vascular access site  Extremities:       No lower extremity edema  Neurologic:      no focal deficits.   alert oriented x3  Skin:      intact without lesions or rashes.    Psych:      alert and cooperative; normal mood and affect; normal attention span and concentration.      Discharge Disposition  Home or Self Care    Discharge Medications     Discharge Medications      Changes to Medications      Instructions Start Date   flecainide 100 MG tablet  Commonly known as: TAMBOCOR  What changed: how much to take   50 mg, Oral, 2 Times Daily      metoprolol tartrate 25 MG tablet  Commonly known as: LOPRESSOR  What changed: how much to take   12.5 mg, Oral, 2 Times Daily         Continue These Medications      Instructions Start Date   apixaban 5 MG tablet tablet  Commonly known as: ELIQUIS   5 mg, Oral, 2 Times Daily      ascorbic acid 1000 MG tablet  Commonly known as: VITAMIN C   1,000 mg, Oral, Daily      lisinopril-hydrochlorothiazide 20-25 MG per tablet  Commonly known as: PRINZIDE,ZESTORETIC   1 tablet, Oral, 2 Times Daily    "   mometasone 0.1 % cream  Commonly known as: ELOCON   1 application, Topical, Daily PRN, Bilateral ears      omeprazole 20 MG capsule  Commonly known as: priLOSEC   20 mg, Oral, Daily      rosuvastatin 10 MG tablet  Commonly known as: CRESTOR   10 mg, Oral, Daily      Vitamin D-3 125 MCG (5000 UT) tablet   1 tablet, Oral, Daily      Zinc 50 MG tablet   50 mg, Oral, Daily             Discharge Diet: Cardiac    Activity at Discharge: No heavy lifting for 3 to 5 days    Follow-up Appointments  No future appointments.    Risk for Readmission (LACE) Score: 1 (3/21/2022  1:14 PM)      Hermila Wasserman MD  03/21/22  15:07 EDT

## 2022-03-21 NOTE — H&P
History of present illness  Becky Carmona is a 71-year-old female patient who has palpitations for many years, I had seen her in January 2021 for the same.  Previously no diagnosis was made.  On that day she was in sinus rhythm however a Holter monitor was placed and patient developed palpitations yesterday and we received an alert showing atrial fibrillation with ventricular rates in the 170s.  Patient was called and she was seen in the clinic today.  She still complains of palpitations but mainly shortness of breath and weakness.  Denies any chest pain no syncopal episodes.  She says that the symptoms have been going on for several years with same presentation.          Past Medical History:   Diagnosis Date   • Hypertension              Past Surgical History:   Procedure Laterality Date   • CHOLECYSTECTOMY                Family History   Problem Relation Age of Onset   • Heart disease Mother     • Hypertension Mother     • Hypertension Father     • Heart disease Sister     • Hypertension Sister        Social History           Tobacco Use   • Smoking status: Never Smoker   • Smokeless tobacco: Never Used   Vaping Use   • Vaping Use: Never used   Substance Use Topics   • Alcohol use: Not Currently   • Drug use: Never      (Not in a hospital admission)     Allergies:  Ciprofloxacin and Nitrofurantoin macrocrystal     Review of systems     Constitutional: Negative.    Respiratory and cardiovascular: As detailed in HPI section.  Gastrointestinal: Negative for constipation, nausea and vomiting negative for abdominal distention, abdominal pain and diarrhea.   Genitourinary: Negative for difficulty urinating and flank pain.   Musculoskeletal: Negative for arthralgias, joint swelling and myalgias.   Skin: Negative for color change, rash and wound.   Neurological: Negative for dizziness, syncope, weakness and headaches.   Hematological: Negative for adenopathy.   Psychiatric/Behavioral: Negative for confusion.   All  other systems reviewed and are negative.     Physical Exam  VITALS REVIEWED     General:      well developed, in no acute distress.    Head:      normocephalic and atraumatic.    Eyes:      PERRL/EOM intact, conjunctiva and sclera clear with out nystagmus.    Neck:      no masses, thyromegaly,  trachea central with normal respiratory effort and PMI displaced laterally  Lungs:      Clear to auscultation bilaterally  Heart:       Tachycardic, irregular rhythm  Msk:      no deformity or scoliosis noted of thoracic or lumbar spine.    Pulses:      pulses normal in all 4 extremities.    Extremities:       No lower extremity edema  Neurologic:      no focal deficits.   alert oriented x3  Skin:      intact without lesions or rashes.    Psych:      alert and cooperative; normal mood and affect; normal attention span and concentration.             Result Review    :                     Pertinent cardiac workup     1. Echocardiogram 1/12/2022 ejection fraction 60 to 65%, mild aortic insufficiency  2. EKG 1/12/2022 sinus rhythm  3. EKG 2/15/2022 atrial fibrillation ventricular rate of 173 bpm           ECG 12 Lead     Date/Time: 2/15/2022 1:25 PM  Performed by: Hermila Wasserman MD  Authorized by: Hermila Wasserman MD   Comparison: compared with previous ECG   Comparison to previous ECG: Previously sinus  Rhythm: atrial fibrillation  Rate: tachycardic  BPM: 173  Conduction: conduction normal  ST Segments: ST segments normal  QRS axis: normal  Other findings: non-specific ST-T wave changes                     Assessment      Assessment and Plan       Becky Carmona is a 71-year-old female patient who has history of palpitations for several years also fatigue and extreme shortness of breath during the episodes.  Previously no diagnosis was made however a Holter monitor was placed and she was found to be in atrial fibrillation with rapid ventricular rates on 2/14/2022.  Today she is also in A. fib with a rate of 173 on EKG.   I had a long discussion with the patient about A. fib management.  Since her atrial fibrillation is paroxysmal and she is very symptomatic when it, all efforts should be made towards rhythm control strategy.  I offered antiarrhythmic therapy versus ablation patient is more interested in having an ablation done.  I will go ahead and start her on Eliquis 5 mg twice daily also start her on flecainide 100 twice daily in addition to metoprolol 25 twice a day with plans for A. fib ablation in March.  Hopefully the patient will convert with antiarrhythmic therapy in the meantime, otherwise she will need cardioversion soon.  Patient is agreeable with the outlined plan.

## 2022-03-21 NOTE — DISCHARGE INSTRUCTIONS
Post Ablation Instructions    Drink plenty of fluids for the next 24 hours.  This helps to eliminate the dye used in your procedure through urination.  You may resume a normal diet; however, try to avoid foods that would cause gas or constipation.    Sedative medication given to you during your catheterization may decrease your judgement and reaction time for up to 24-48 hours.  Therefore:  DO NOT drive or operate hazardous machinery (48 hours)  DO NOT consume alcoholic beverages  DO NOT make any important/legal decisions  Have someone stay with you for at least 24 hours    To allow proper healing and prevent bleeding, the following activities are to be strictly avoided for the next 24-48 hours:  Excessive bending at wound site  Straining (anything that would tense up muscles around the affected puncture site)  Lifting objects greater than 5 pounds, pushing, or pulling for 5 days  For Groin Cases:  Refrain from sexual activity  Refrain from running or vigorous walking  No prolonged sitting or standing  Limit stair climbing as much as possible    Keep the puncture site clean and dry.  You may remove the dressing tomorrow and replace it with a band-aid for at least one additional day.  Gently clean the site with mild soap and water.  No scrubbing/rubbing and lightly pat the area dry.  Showers are acceptable; however, avoid submerging in water (tub baths, hot tubs, swimming pools, dishwater, etc…) for at least one week.  The site should be completely healed before resuming these activities to reduce the risk of infection.  Check the site often.  Watch for signs and symptoms of infection and notify your physician if any of the following occur:  Bleeding or an increase in swelling at the puncture site  Fever  Increased soreness around puncture site  Foul odor or significant drainage from the puncture site  Swelling, redness, or warmth at the puncture site    **A bruise or small “pea sized” lump under the skin at the  puncture site is not unusual.  This should disappear within 3-4 weeks.**  CONTACT YOUR PHYSICIAN OR CALL 911 IF YOU EXPERIENCE ANY OF THE FOLLOWING:  Increased angina (chest pain) or frequent sensations of pressure, burning, pain, or other discomfort in the chest, arm, jaws, or stomach  Lightheadedness, dizziness, faint feeling, sweating, or difficulty breathing  Odd sensation changes like numbness, tingling, coldness, or pain in the arm or leg in which the catheter was inserted  Limb in which the catheter was inserted becomes pale/bluish in color    IMPORTANT:  Although this occurs very rarely, if you should develop bright red or excessive bleeding, feel a “pop” inside at the insertion site, or notice a sudden increase in swelling larger than a walnut, you should call 911.  Hold continuous firm pressure to the access site until emergency personnel arrive.  It is best if someone else can do this for you.

## 2022-03-21 NOTE — ANESTHESIA PREPROCEDURE EVALUATION
Anesthesia Evaluation     Patient summary reviewed and Nursing notes reviewed   NPO Solid Status: > 8 hours  NPO Liquid Status: > 8 hours           Airway   Mallampati: II  TM distance: >3 FB  Neck ROM: full  No difficulty expected  Dental - normal exam     Pulmonary    Cardiovascular     (+) hypertension, dysrhythmias Paroxysmal Atrial Fib, hyperlipidemia,       Neuro/Psych  GI/Hepatic/Renal/Endo      Musculoskeletal     Abdominal    Substance History      OB/GYN          Other                        Anesthesia Plan    ASA 3     general     intravenous induction     Anesthetic plan, all risks, benefits, and alternatives have been provided, discussed and informed consent has been obtained with: patient.    Plan discussed with CRNA and CAA.        CODE STATUS:

## 2022-03-21 NOTE — ANESTHESIA PROCEDURE NOTES
Airway  Urgency: elective    Date/Time: 3/21/2022 8:22 AM  Airway not difficult    General Information and Staff    Patient location during procedure: OR  Anesthesiologist: Pedro Scott MD  CRNA: Jenni Bello CAA    Indications and Patient Condition  Indications for airway management: airway protection    Preoxygenated: yes  Mask difficulty assessment: 1 - vent by mask    Final Airway Details  Final airway type: endotracheal airway      Successful airway: ETT  Cuffed: yes   Successful intubation technique: direct laryngoscopy  Facilitating devices/methods: intubating stylet  Endotracheal tube insertion site: oral  Blade: Trish  Blade size: 3  ETT size (mm): 7.0  Cormack-Lehane Classification: grade I - full view of glottis  Placement verified by: chest auscultation and capnometry   Measured from: lips  ETT/EBT  to lips (cm): 21  Number of attempts at approach: 1  Assessment: lips, teeth, and gum same as pre-op and atraumatic intubation

## 2022-03-23 ENCOUNTER — TELEPHONE (OUTPATIENT)
Dept: CARDIOLOGY | Facility: CLINIC | Age: 72
End: 2022-03-23

## 2022-04-27 ENCOUNTER — OFFICE VISIT (OUTPATIENT)
Dept: CARDIOLOGY | Facility: CLINIC | Age: 72
End: 2022-04-27

## 2022-04-27 VITALS
HEIGHT: 64 IN | OXYGEN SATURATION: 98 % | BODY MASS INDEX: 30.22 KG/M2 | DIASTOLIC BLOOD PRESSURE: 65 MMHG | SYSTOLIC BLOOD PRESSURE: 99 MMHG | HEART RATE: 55 BPM | WEIGHT: 177 LBS

## 2022-04-27 DIAGNOSIS — I48.0 PAROXYSMAL ATRIAL FIBRILLATION: ICD-10-CM

## 2022-04-27 DIAGNOSIS — I10 PRIMARY HYPERTENSION: ICD-10-CM

## 2022-04-27 PROCEDURE — 93010 ELECTROCARDIOGRAM REPORT: CPT | Performed by: INTERNAL MEDICINE

## 2022-04-27 PROCEDURE — 99213 OFFICE O/P EST LOW 20 MIN: CPT | Performed by: INTERNAL MEDICINE

## 2022-04-27 NOTE — PROGRESS NOTES
Progress note      Name: Becky Carmona ADMIT: (Not on file)   : 1950  PCP: Medhat Gregory MD    MRN: [id number] LOS: 0 days   AGE/SEX: 71 y.o. female  ROOM: Room/bed info not found     Chief Complaint   Patient presents with   • Atrial Fibrillation     ABLATION F/U       Subjective       History of present illness  Becky Carmona is a 71-year-old female patient who has had palpitations for many years, A. fib was diagnosed in 2022 with rapid ventricular rates of up to 180 bpm on Holter monitor.  Also has hypertension.  Patient underwent PVI on 3/21/2022 and she is here today for follow-up.  Since procedure she has not had any palpitations, no chest pain no shortness of breath no lower extremity edema no syncopal episodes.    Past Medical History:   Diagnosis Date   • A-fib (HCC)    • High triglycerides    • HLD (hyperlipidemia)    • Hypertension      Past Surgical History:   Procedure Laterality Date   • CARDIAC ELECTROPHYSIOLOGY PROCEDURE N/A 3/21/2022    Procedure: Ablation atrial fibrillation Utong and Cryo aware;  Surgeon: Hermila Wasserman MD;  Location: [facility] INVASIVE LOCATION;  Service: Cardiovascular;  Laterality: N/A;   • CHOLECYSTECTOMY     • UTERINE FIBROID SURGERY       Family History   Problem Relation Age of Onset   • Heart disease Mother    • Hypertension Mother    • Hypertension Father    • Heart disease Sister    • Hypertension Sister      Social History     Tobacco Use   • Smoking status: Never Smoker   • Smokeless tobacco: Never Used   Vaping Use   • Vaping Use: Never used   Substance Use Topics   • Alcohol use: Not Currently   • Drug use: Never     (Not in a hospital admission)    Allergies:  Ciprofloxacin and Nitrofurantoin macrocrystal      Physical Exam  VITALS REVIEWED    General:      well developed, in no acute distress.    Head:      normocephalic and atraumatic.    Eyes:      PERRL/EOM intact, conjunctiva and sclera clear with out nystagmus.     Neck:      no masses, thyromegaly,  trachea central with normal respiratory effort and PMI displaced laterally  Lungs:      Clear to auscultation bilaterally  Heart:       regular rate and rhythm  Msk:      no deformity or scoliosis noted of thoracic or lumbar spine.    Pulses:      pulses normal in all 4 extremities.    Extremities:       no lower extremity edema  Neurologic:      no focal deficits.   alert oriented x3  Skin:      intact without lesions or rashes.    Psych:      alert and cooperative; normal mood and affect; normal attention span and concentration.      Result Review :               Pertinent cardiac workup    1. Echocardiogram 1/12/2022 ejection fraction 60 to 65%, mild aortic insufficiency  2. EKG 1/12/2022 sinus rhythm  3. EKG 2/15/2022 atrial fibrillation ventricular rate of 173 bpm  4. Event monitor in January 2022 showed paroxysmal atrial fibrillation ventricular rate as high as 252 bpm.  5. EKG 4/27/2022 sinus bradycardia at 47 bpm.      Procedures        Assessment and Plan      Becky Carmona is a 71-year-old female patient who has paroxysmal atrial fibrillation for many years, official diagnosis was made in January 2022.  Patient underwent A. fib ablation on 3/21/2022.  Since the ablation she has not had any palpitations.  Today's EKG shows sinus bradycardia.  Patient would like to come off some of her medications, we will start with discontinuing flecainide.  In 3 months I will see her in follow-up in the no A. fib then we can stop the Eliquis as well.  I also instructed her about portable EKG monitors which are very handy especially in patients with history of atrial fibrillation.    Diagnoses and all orders for this visit:    1. Primary hypertension    2. Paroxysmal atrial fibrillation (HCC)  Overview:  Added automatically from request for surgery [id number]             No follow-ups on file.  Patient was given instructions and counseling regarding her condition or for health  maintenance advice. Please see specific information pulled into the AVS if appropriate.

## 2022-07-19 RX ORDER — APIXABAN 5 MG/1
TABLET, FILM COATED ORAL
Qty: 180 TABLET | Refills: 3 | Status: SHIPPED | OUTPATIENT
Start: 2022-07-19 | End: 2022-07-20

## 2022-07-20 ENCOUNTER — OFFICE VISIT (OUTPATIENT)
Dept: CARDIOLOGY | Facility: CLINIC | Age: 72
End: 2022-07-20

## 2022-07-20 VITALS
DIASTOLIC BLOOD PRESSURE: 72 MMHG | OXYGEN SATURATION: 98 % | HEART RATE: 58 BPM | SYSTOLIC BLOOD PRESSURE: 133 MMHG | HEIGHT: 65 IN | WEIGHT: 179 LBS | BODY MASS INDEX: 29.82 KG/M2

## 2022-07-20 DIAGNOSIS — I48.0 PAROXYSMAL ATRIAL FIBRILLATION: Primary | ICD-10-CM

## 2022-07-20 DIAGNOSIS — I10 PRIMARY HYPERTENSION: ICD-10-CM

## 2022-07-20 PROCEDURE — 99214 OFFICE O/P EST MOD 30 MIN: CPT | Performed by: INTERNAL MEDICINE

## 2022-07-20 NOTE — PROGRESS NOTES
Progress note      Name: Becky Carmona ADMIT: (Not on file)   : 1950  PCP: Medhat Gregory MD    MRN: [id number] LOS: 0 days   AGE/SEX: 71 y.o. female  ROOM: Room/bed info not found     Chief Complaint   Patient presents with   • Atrial Fibrillation   • Hypertension       Subjective       History of present illness  Becky Carmona is a 71-year-old female patient who has had palpitations for many years, A. fib was diagnosed in 2022 with rapid ventricular rates of up to 180 bpm on Holter monitor.  Also has hypertension.  Patient underwent PVI on 3/21/2022 and she is here today for follow-up.  Since procedure she has not had any palpitations, no chest pain no shortness of breath no lower extremity edema no syncopal episodes.    Past Medical History:   Diagnosis Date   • A-fib (HCC)    • High triglycerides    • HLD (hyperlipidemia)    • Hypertension      Past Surgical History:   Procedure Laterality Date   • CARDIAC ELECTROPHYSIOLOGY PROCEDURE N/A 3/21/2022    Procedure: Ablation atrial fibrillation Utong and Cryo aware;  Surgeon: Hermila Wasserman MD;  Location: [facility] INVASIVE LOCATION;  Service: Cardiovascular;  Laterality: N/A;   • CHOLECYSTECTOMY     • UTERINE FIBROID SURGERY       Family History   Problem Relation Age of Onset   • Heart disease Mother    • Hypertension Mother    • Hypertension Father    • Heart disease Sister    • Hypertension Sister      Social History     Tobacco Use   • Smoking status: Never Smoker   • Smokeless tobacco: Never Used   Vaping Use   • Vaping Use: Never used   Substance Use Topics   • Alcohol use: Not Currently   • Drug use: Never     (Not in a hospital admission)    Allergies:  Ciprofloxacin and Nitrofurantoin macrocrystal      Physical Exam  VITALS REVIEWED    General:      well developed, in no acute distress.    Head:      normocephalic and atraumatic.    Eyes:      PERRL/EOM intact, conjunctiva and sclera clear with out nystagmus.     Neck:      no masses, thyromegaly,  trachea central with normal respiratory effort and PMI displaced laterally  Lungs:      Clear to auscultation bilaterally  Heart:       regular rate and rhythm  Msk:      no deformity or scoliosis noted of thoracic or lumbar spine.    Pulses:      pulses normal in all 4 extremities.    Extremities:       no lower extremity edema  Neurologic:      no focal deficits.   alert oriented x3  Skin:      intact without lesions or rashes.    Psych:      alert and cooperative; normal mood and affect; normal attention span and concentration.      Result Review :               Pertinent cardiac workup      1. Echocardiogram 1/12/2022 ejection fraction 60 to 65%, mild aortic insufficiency  2. EKG 1/12/2022 sinus rhythm  3. EKG 2/15/2022 atrial fibrillation ventricular rate of 173 bpm  4. Event monitor in January 2022 showed paroxysmal atrial fibrillation ventricular rate as high as 252 bpm.  5. EKG 4/27/2022 sinus bradycardia at 47 bpm.        Procedures        Assessment and Plan      Becky Carmona  is a 71-year-old female patient who has paroxysmal atrial fibrillation for many years, official diagnosis was made in January 2022.  Patient underwent A. fib ablation on 3/21/2022.  Since the ablation she has not had any palpitations.  We had stopped the flecainide in April 2022 and patient has not had any A. fib.  Per our discussion, we will go ahead and stop the Eliquis and instead I advised her to take a baby aspirin.  Patient will remain off on metoprolol.  I also encouraged her to get portable EKG monitor.  We will see her in follow-up in 6 months.    Diagnoses and all orders for this visit:    1. Paroxysmal atrial fibrillation (HCC) (Primary)  Overview:  Added automatically from request for surgery [id number]      2. Primary hypertension           No follow-ups on file.  Patient was given instructions and counseling regarding her condition or for health maintenance advice. Please see  specific information pulled into the AVS if appropriate.

## 2022-09-27 ENCOUNTER — OFFICE (OUTPATIENT)
Dept: URBAN - METROPOLITAN AREA CLINIC 64 | Facility: CLINIC | Age: 72
End: 2022-09-27

## 2022-09-27 VITALS
HEIGHT: 66 IN | DIASTOLIC BLOOD PRESSURE: 62 MMHG | SYSTOLIC BLOOD PRESSURE: 127 MMHG | WEIGHT: 175 LBS | HEART RATE: 63 BPM

## 2022-09-27 DIAGNOSIS — Z12.11 ENCOUNTER FOR SCREENING FOR MALIGNANT NEOPLASM OF COLON: ICD-10-CM

## 2022-09-27 DIAGNOSIS — K21.9 GASTRO-ESOPHAGEAL REFLUX DISEASE WITHOUT ESOPHAGITIS: ICD-10-CM

## 2022-09-27 PROCEDURE — 99203 OFFICE O/P NEW LOW 30 MIN: CPT | Performed by: INTERNAL MEDICINE

## 2022-09-27 RX ORDER — PANTOPRAZOLE SODIUM 40 MG/1
TABLET, DELAYED RELEASE ORAL
Qty: 60 | Refills: 13 | Status: ACTIVE

## 2022-09-27 RX ORDER — OMEPRAZOLE 20 MG/1
CAPSULE, DELAYED RELEASE ORAL
Qty: 60 | Refills: 2 | Status: COMPLETED
End: 2022-09-27

## 2022-10-28 ENCOUNTER — OFFICE (OUTPATIENT)
Dept: URBAN - METROPOLITAN AREA PATHOLOGY 4 | Facility: PATHOLOGY | Age: 72
End: 2022-10-28
Payer: COMMERCIAL

## 2022-10-28 ENCOUNTER — ON CAMPUS - OUTPATIENT (OUTPATIENT)
Dept: URBAN - METROPOLITAN AREA HOSPITAL 2 | Facility: HOSPITAL | Age: 72
End: 2022-10-28

## 2022-10-28 ENCOUNTER — OFFICE (OUTPATIENT)
Dept: URBAN - METROPOLITAN AREA PATHOLOGY 4 | Facility: PATHOLOGY | Age: 72
End: 2022-10-28

## 2022-10-28 VITALS
SYSTOLIC BLOOD PRESSURE: 137 MMHG | DIASTOLIC BLOOD PRESSURE: 76 MMHG | SYSTOLIC BLOOD PRESSURE: 173 MMHG | DIASTOLIC BLOOD PRESSURE: 51 MMHG | HEART RATE: 42 BPM | HEART RATE: 49 BPM | SYSTOLIC BLOOD PRESSURE: 120 MMHG | OXYGEN SATURATION: 100 % | WEIGHT: 173 LBS | HEART RATE: 50 BPM | DIASTOLIC BLOOD PRESSURE: 79 MMHG | SYSTOLIC BLOOD PRESSURE: 117 MMHG | OXYGEN SATURATION: 98 % | DIASTOLIC BLOOD PRESSURE: 78 MMHG | HEART RATE: 62 BPM | OXYGEN SATURATION: 99 % | DIASTOLIC BLOOD PRESSURE: 90 MMHG | RESPIRATION RATE: 20 BRPM | SYSTOLIC BLOOD PRESSURE: 124 MMHG | DIASTOLIC BLOOD PRESSURE: 50 MMHG | HEART RATE: 88 BPM | SYSTOLIC BLOOD PRESSURE: 105 MMHG | HEART RATE: 51 BPM | SYSTOLIC BLOOD PRESSURE: 107 MMHG | HEIGHT: 66 IN | DIASTOLIC BLOOD PRESSURE: 57 MMHG | HEART RATE: 61 BPM | DIASTOLIC BLOOD PRESSURE: 58 MMHG | DIASTOLIC BLOOD PRESSURE: 64 MMHG | DIASTOLIC BLOOD PRESSURE: 92 MMHG | SYSTOLIC BLOOD PRESSURE: 97 MMHG | HEART RATE: 77 BPM | RESPIRATION RATE: 10 BRPM | RESPIRATION RATE: 18 BRPM | SYSTOLIC BLOOD PRESSURE: 109 MMHG | TEMPERATURE: 97.6 F | SYSTOLIC BLOOD PRESSURE: 88 MMHG | DIASTOLIC BLOOD PRESSURE: 59 MMHG | RESPIRATION RATE: 16 BRPM | SYSTOLIC BLOOD PRESSURE: 84 MMHG | HEART RATE: 48 BPM

## 2022-10-28 DIAGNOSIS — K29.60 OTHER GASTRITIS WITHOUT BLEEDING: ICD-10-CM

## 2022-10-28 DIAGNOSIS — K31.89 OTHER DISEASES OF STOMACH AND DUODENUM: ICD-10-CM

## 2022-10-28 DIAGNOSIS — D12.2 BENIGN NEOPLASM OF ASCENDING COLON: ICD-10-CM

## 2022-10-28 DIAGNOSIS — D12.3 BENIGN NEOPLASM OF TRANSVERSE COLON: ICD-10-CM

## 2022-10-28 DIAGNOSIS — K21.01 GASTRO-ESOPHAGEAL REFLUX DISEASE WITH ESOPHAGITIS, WITH BLEE: ICD-10-CM

## 2022-10-28 DIAGNOSIS — K57.30 DIVERTICULOSIS OF LARGE INTESTINE WITHOUT PERFORATION OR ABS: ICD-10-CM

## 2022-10-28 DIAGNOSIS — K64.1 SECOND DEGREE HEMORRHOIDS: ICD-10-CM

## 2022-10-28 DIAGNOSIS — Z12.11 ENCOUNTER FOR SCREENING FOR MALIGNANT NEOPLASM OF COLON: ICD-10-CM

## 2022-10-28 PROBLEM — K63.5 POLYP OF COLON: Status: ACTIVE | Noted: 2022-10-28

## 2022-10-28 PROBLEM — K20.81 OTHER ESOPHAGITIS WITH BLEEDING: Status: ACTIVE | Noted: 2022-10-28

## 2022-10-28 LAB
GI HISTOLOGY: A. UNSPECIFIED: (no result)
GI HISTOLOGY: B. UNSPECIFIED: (no result)
GI HISTOLOGY: C. UNSPECIFIED: (no result)
GI HISTOLOGY: PDF REPORT: (no result)

## 2022-10-28 PROCEDURE — 45380 COLONOSCOPY AND BIOPSY: CPT | Mod: PT | Performed by: INTERNAL MEDICINE

## 2022-10-28 PROCEDURE — 43239 EGD BIOPSY SINGLE/MULTIPLE: CPT | Performed by: INTERNAL MEDICINE

## 2022-10-28 PROCEDURE — 88305 TISSUE EXAM BY PATHOLOGIST: CPT | Mod: 26 | Performed by: INTERNAL MEDICINE

## 2022-10-28 RX ORDER — PANTOPRAZOLE SODIUM 40 MG/1
TABLET, DELAYED RELEASE ORAL
Qty: 60 | Refills: 13 | Status: ACTIVE

## 2022-10-28 RX ADMIN — HYOSCYAMINE SULFATE: 0.12 TABLET SUBLINGUAL at 10:51

## 2023-01-18 ENCOUNTER — OFFICE VISIT (OUTPATIENT)
Dept: CARDIOLOGY | Facility: CLINIC | Age: 73
End: 2023-01-18
Payer: MEDICARE

## 2023-01-18 VITALS
DIASTOLIC BLOOD PRESSURE: 72 MMHG | OXYGEN SATURATION: 97 % | WEIGHT: 174 LBS | SYSTOLIC BLOOD PRESSURE: 111 MMHG | BODY MASS INDEX: 28.99 KG/M2 | HEART RATE: 69 BPM | HEIGHT: 65 IN

## 2023-01-18 DIAGNOSIS — I48.0 PAROXYSMAL ATRIAL FIBRILLATION: ICD-10-CM

## 2023-01-18 DIAGNOSIS — I10 PRIMARY HYPERTENSION: Primary | ICD-10-CM

## 2023-01-18 PROCEDURE — 99213 OFFICE O/P EST LOW 20 MIN: CPT | Performed by: INTERNAL MEDICINE

## 2023-01-18 RX ORDER — PANTOPRAZOLE SODIUM 40 MG/1
1 TABLET, DELAYED RELEASE ORAL 2 TIMES DAILY
COMMUNITY
Start: 2023-01-11

## 2023-01-18 NOTE — PROGRESS NOTES
Progress note      Name: Becky Carmona ADMIT: (Not on file)   : 1950  PCP: Medhat Gregory MD    MRN: [id number] LOS: 0 days   AGE/SEX: 72 y.o. female  ROOM: Room/bed info not found     Chief Complaint   Patient presents with   • Atrial Fibrillation       Subjective       History of present illness  Becky Carmona  is a 72-year-old female patient who has had palpitations for many years, A. fib was diagnosed in 2022 with rapid ventricular rates of up to 180 bpm on Holter monitor.  Also has hypertension.  Patient underwent PVI on 3/21/2022 and she is here today for follow-up.  Since procedure she has not had any palpitations, no chest pain no shortness of breath no lower extremity edema no syncopal episodes.    Past Medical History:   Diagnosis Date   • A-fib (HCC)    • High triglycerides    • HLD (hyperlipidemia)    • Hypertension      Past Surgical History:   Procedure Laterality Date   • CARDIAC ELECTROPHYSIOLOGY PROCEDURE N/A 3/21/2022    Procedure: Ablation atrial fibrillation Utong and Cryo aware;  Surgeon: Hermila Wasserman MD;  Location: [facility] INVASIVE LOCATION;  Service: Cardiovascular;  Laterality: N/A;   • CHOLECYSTECTOMY     • UTERINE FIBROID SURGERY       Family History   Problem Relation Age of Onset   • Heart disease Mother    • Hypertension Mother    • Hypertension Father    • Heart disease Sister    • Hypertension Sister      Social History     Tobacco Use   • Smoking status: Never   • Smokeless tobacco: Never   Vaping Use   • Vaping Use: Never used   Substance Use Topics   • Alcohol use: Not Currently   • Drug use: Never     (Not in a hospital admission)    Allergies:  Ciprofloxacin and Nitrofurantoin macrocrystal      Physical Exam  VITALS REVIEWED    General:      well developed, in no acute distress.    Head:      normocephalic and atraumatic.    Eyes:      PERRL/EOM intact, conjunctiva and sclera clear with out nystagmus.    Neck:      no masses,  thyromegaly,  trachea central with normal respiratory effort and PMI displaced laterally  Lungs:      Clear to auscultation bilaterally  Heart:       Regular rate and rhythm  Msk:      no deformity or scoliosis noted of thoracic or lumbar spine.    Pulses:      pulses normal in all 4 extremities.    Extremities:       No lower extremity edema  Neurologic:      no focal deficits.   alert oriented x3  Skin:      intact without lesions or rashes.    Psych:      alert and cooperative; normal mood and affect; normal attention span and concentration.      Result Review :               Pertinent cardiac workup    1. Echocardiogram 1/12/2022 ejection fraction 60 to 65%, mild aortic insufficiency  2. EKG 1/12/2022 sinus rhythm  3. EKG 2/15/2022 atrial fibrillation ventricular rate of 173 bpm  4. Event monitor in January 2022 showed paroxysmal atrial fibrillation ventricular rate as high as 252 bpm.  5. EKG 1/18/2023 sinus rhythm at 66 bpm.         Procedures        Assessment and Plan      Becky Carmona  is a 72-year-old female patient who has paroxysmal atrial fibrillation for many years, official diagnosis was made in January 2022.  Patient underwent A. fib ablation on 3/21/2022.  Since the ablation she has not had any palpitations.  We had stopped the flecainide in April 2022 and patient has not had any A. fib.  Patient is only on baby aspirin for stroke prevention.  Today's EKG shows sinus rhythm, her blood pressure is well controlled.  We will continue same therapy, we will see her in follow-up in 8 months.    Diagnoses and all orders for this visit:    1. Primary hypertension (Primary)    2. Paroxysmal atrial fibrillation (HCC)  Overview:  Added automatically from request for surgery [id number]           No follow-ups on file.  Patient was given instructions and counseling regarding her condition or for health maintenance advice. Please see specific information pulled into the AVS if appropriate.

## 2024-03-04 NOTE — TELEPHONE ENCOUNTER
Rx Refill Note  Requested Prescriptions     Pending Prescriptions Disp Refills    metoprolol tartrate (LOPRESSOR) 25 MG tablet [Pharmacy Med Name: METOPROLOL TARTRATE 25 MG TAB] 180 tablet 3     Sig: TAKE 1 TABLET BY MOUTH 2 TIMES A DAY FOR 30 DAYS.      Last office visit with prescribing clinician: 1/18/2023   Last telemedicine visit with prescribing clinician: Visit date not found   Next office visit with prescribing clinician: Visit date not found                         Would you like a call back once the refill request has been completed: [] Yes [] No    If the office needs to give you a call back, can they leave a voicemail: [] Yes [] No    Geeta Galvin MA  03/04/24, 11:36 EST

## 2025-01-16 NOTE — TELEPHONE ENCOUNTER
Rx Refill Note  Requested Prescriptions     Pending Prescriptions Disp Refills   • Eliquis 5 MG tablet tablet [Pharmacy Med Name: ELIQUIS 5 MG TABLET] 60 tablet 3     Sig: TAKE 1 TABLET BY MOUTH 2 TIMES A DAY FOR 30 DAYS.      Last office visit with prescribing clinician: 4/27/2022      Next office visit with prescribing clinician: 7/20/2022            Livier Gibson MA  07/19/22, 07:45 EDT  
Adult failure to thrive

## (undated) DEVICE — CABL CATH ABLAT ACHIEVE 196CM 1P/U

## (undated) DEVICE — Device: Brand: WEBSTER CS

## (undated) DEVICE — GW TRNSEP SAFESEPT LT/ATRIAL RO 135CM .014IN

## (undated) DEVICE — CATH ABL ARCTIC FRNT ADV 10.5F3.5X28MM

## (undated) DEVICE — Device: Brand: SMARTABLATE

## (undated) DEVICE — ELECTRD DEFIB M/FUNC PROPADZ RADIOL 2PK

## (undated) DEVICE — ST INTRO PERFORMER W/GW J/TP .038IN 14FR

## (undated) DEVICE — NDL TRNSEP BRK XS LNG 18G 98CM A/

## (undated) DEVICE — TBG PRESS/MONITOR FIX M/F LL A/ 24IN STRL

## (undated) DEVICE — TBG IV DRIP CHAMBER MACRO SGL 72IN

## (undated) DEVICE — Device: Brand: CARTO 3

## (undated) DEVICE — PINNACLE INTRODUCER SHEATH: Brand: PINNACLE

## (undated) DEVICE — INTRO STEER AGILIS NXT MED/CURL 8.5F

## (undated) DEVICE — Device: Brand: PENTARAY NAV

## (undated) DEVICE — PK TRY HEART CATH 50

## (undated) DEVICE — CABL CONN CATH EP COAXL UMB 72IN

## (undated) DEVICE — Device: Brand: THERMOCOOL SMARTTOUCH SF

## (undated) DEVICE — Device: Brand: TORAYGUIDE GUIDEWIRE

## (undated) DEVICE — PROVE COVER: Brand: UNBRANDED

## (undated) DEVICE — SHEATH FLXCATH STEER 12FR

## (undated) DEVICE — Device: Brand: REFERENCE PATCH CARTO 3

## (undated) DEVICE — CABL CONN CATH EP UMB 48IN

## (undated) DEVICE — Device: Brand: SOUNDSTAR

## (undated) DEVICE — CATH ABL ACHIEVE MP 3.3F20MM 165CM

## (undated) DEVICE — PAD E/S GRND SGL/FOIL 9FT/CORD DISP